# Patient Record
Sex: MALE | Race: WHITE | NOT HISPANIC OR LATINO
[De-identification: names, ages, dates, MRNs, and addresses within clinical notes are randomized per-mention and may not be internally consistent; named-entity substitution may affect disease eponyms.]

---

## 2018-07-31 PROBLEM — Z00.00 ENCOUNTER FOR PREVENTIVE HEALTH EXAMINATION: Status: ACTIVE | Noted: 2018-07-31

## 2018-08-01 ENCOUNTER — APPOINTMENT (OUTPATIENT)
Dept: CT IMAGING | Facility: CLINIC | Age: 73
End: 2018-08-01
Payer: MEDICARE

## 2018-08-01 ENCOUNTER — OUTPATIENT (OUTPATIENT)
Dept: OUTPATIENT SERVICES | Facility: HOSPITAL | Age: 73
LOS: 1 days | End: 2018-08-01
Payer: MEDICARE

## 2018-08-01 DIAGNOSIS — Z00.8 ENCOUNTER FOR OTHER GENERAL EXAMINATION: ICD-10-CM

## 2018-08-01 PROCEDURE — 71260 CT THORAX DX C+: CPT | Mod: 26

## 2018-08-01 PROCEDURE — 74177 CT ABD & PELVIS W/CONTRAST: CPT | Mod: 26

## 2018-08-01 PROCEDURE — 71260 CT THORAX DX C+: CPT

## 2018-08-01 PROCEDURE — 82565 ASSAY OF CREATININE: CPT

## 2018-08-01 PROCEDURE — 74177 CT ABD & PELVIS W/CONTRAST: CPT

## 2018-08-08 ENCOUNTER — FORM ENCOUNTER (OUTPATIENT)
Age: 73
End: 2018-08-08

## 2018-08-09 ENCOUNTER — APPOINTMENT (OUTPATIENT)
Dept: SURGERY | Facility: CLINIC | Age: 73
End: 2018-08-09
Payer: MEDICARE

## 2018-08-09 ENCOUNTER — OUTPATIENT (OUTPATIENT)
Dept: OUTPATIENT SERVICES | Facility: HOSPITAL | Age: 73
LOS: 1 days | End: 2018-08-09
Payer: MEDICARE

## 2018-08-09 ENCOUNTER — APPOINTMENT (OUTPATIENT)
Dept: MRI IMAGING | Facility: IMAGING CENTER | Age: 73
End: 2018-08-09
Payer: MEDICARE

## 2018-08-09 VITALS
RESPIRATION RATE: 15 BRPM | DIASTOLIC BLOOD PRESSURE: 91 MMHG | HEART RATE: 75 BPM | OXYGEN SATURATION: 99 % | TEMPERATURE: 98.2 F | SYSTOLIC BLOOD PRESSURE: 163 MMHG

## 2018-08-09 VITALS — BODY MASS INDEX: 26.32 KG/M2 | HEIGHT: 71 IN | WEIGHT: 188 LBS

## 2018-08-09 DIAGNOSIS — Z87.19 PERSONAL HISTORY OF OTHER DISEASES OF THE DIGESTIVE SYSTEM: ICD-10-CM

## 2018-08-09 DIAGNOSIS — I10 ESSENTIAL (PRIMARY) HYPERTENSION: ICD-10-CM

## 2018-08-09 DIAGNOSIS — Z78.9 OTHER SPECIFIED HEALTH STATUS: ICD-10-CM

## 2018-08-09 DIAGNOSIS — R01.1 CARDIAC MURMUR, UNSPECIFIED: ICD-10-CM

## 2018-08-09 DIAGNOSIS — C20 MALIGNANT NEOPLASM OF RECTUM: ICD-10-CM

## 2018-08-09 PROCEDURE — 99204 OFFICE O/P NEW MOD 45 MIN: CPT

## 2018-08-09 PROCEDURE — 72197 MRI PELVIS W/O & W/DYE: CPT

## 2018-08-09 PROCEDURE — 72197 MRI PELVIS W/O & W/DYE: CPT | Mod: 26

## 2018-08-09 PROCEDURE — 45300 PROCTOSIGMOIDOSCOPY DX: CPT

## 2018-08-09 PROCEDURE — A9585: CPT

## 2018-08-09 PROCEDURE — 82565 ASSAY OF CREATININE: CPT

## 2018-08-15 ENCOUNTER — APPOINTMENT (OUTPATIENT)
Dept: SURGERY | Facility: CLINIC | Age: 73
End: 2018-08-15
Payer: MEDICARE

## 2018-08-15 VITALS
OXYGEN SATURATION: 99 % | RESPIRATION RATE: 16 BRPM | SYSTOLIC BLOOD PRESSURE: 160 MMHG | TEMPERATURE: 98.2 F | DIASTOLIC BLOOD PRESSURE: 75 MMHG | HEART RATE: 82 BPM

## 2018-08-15 PROCEDURE — 45300 PROCTOSIGMOIDOSCOPY DX: CPT

## 2018-08-15 PROCEDURE — 99214 OFFICE O/P EST MOD 30 MIN: CPT | Mod: 25

## 2018-08-17 ENCOUNTER — RESULT REVIEW (OUTPATIENT)
Age: 73
End: 2018-08-17

## 2018-08-29 ENCOUNTER — OUTPATIENT (OUTPATIENT)
Dept: OUTPATIENT SERVICES | Facility: HOSPITAL | Age: 73
LOS: 1 days | End: 2018-08-29
Payer: MEDICARE

## 2018-08-29 ENCOUNTER — APPOINTMENT (OUTPATIENT)
Dept: UROLOGY | Facility: CLINIC | Age: 73
End: 2018-08-29
Payer: MEDICARE

## 2018-08-29 VITALS
SYSTOLIC BLOOD PRESSURE: 165 MMHG | WEIGHT: 186 LBS | HEIGHT: 71 IN | DIASTOLIC BLOOD PRESSURE: 74 MMHG | RESPIRATION RATE: 16 BRPM | HEART RATE: 78 BPM | TEMPERATURE: 98.8 F | BODY MASS INDEX: 26.04 KG/M2

## 2018-08-29 VITALS
SYSTOLIC BLOOD PRESSURE: 146 MMHG | HEART RATE: 75 BPM | TEMPERATURE: 99 F | RESPIRATION RATE: 16 BRPM | OXYGEN SATURATION: 100 % | WEIGHT: 189.6 LBS | HEIGHT: 71 IN | DIASTOLIC BLOOD PRESSURE: 82 MMHG

## 2018-08-29 DIAGNOSIS — I10 ESSENTIAL (PRIMARY) HYPERTENSION: ICD-10-CM

## 2018-08-29 DIAGNOSIS — Z87.19 PERSONAL HISTORY OF OTHER DISEASES OF THE DIGESTIVE SYSTEM: Chronic | ICD-10-CM

## 2018-08-29 DIAGNOSIS — C18.9 MALIGNANT NEOPLASM OF COLON, UNSPECIFIED: ICD-10-CM

## 2018-08-29 DIAGNOSIS — S86.019A STRAIN OF UNSPECIFIED ACHILLES TENDON, INITIAL ENCOUNTER: Chronic | ICD-10-CM

## 2018-08-29 DIAGNOSIS — C20 MALIGNANT NEOPLASM OF RECTUM: ICD-10-CM

## 2018-08-29 DIAGNOSIS — Z01.818 ENCOUNTER FOR OTHER PREPROCEDURAL EXAMINATION: ICD-10-CM

## 2018-08-29 LAB
ANION GAP SERPL CALC-SCNC: 16 MMOL/L — SIGNIFICANT CHANGE UP (ref 5–17)
BLD GP AB SCN SERPL QL: NEGATIVE — SIGNIFICANT CHANGE UP
BUN SERPL-MCNC: 16 MG/DL — SIGNIFICANT CHANGE UP (ref 7–23)
CALCIUM SERPL-MCNC: 9.1 MG/DL — SIGNIFICANT CHANGE UP (ref 8.4–10.5)
CHLORIDE SERPL-SCNC: 100 MMOL/L — SIGNIFICANT CHANGE UP (ref 96–108)
CO2 SERPL-SCNC: 20 MMOL/L — LOW (ref 22–31)
CREAT SERPL-MCNC: 0.95 MG/DL — SIGNIFICANT CHANGE UP (ref 0.5–1.3)
GLUCOSE SERPL-MCNC: 133 MG/DL — HIGH (ref 70–99)
HBA1C BLD-MCNC: 5.8 % — HIGH (ref 4–5.6)
HCT VFR BLD CALC: 41.5 % — SIGNIFICANT CHANGE UP (ref 39–50)
HGB BLD-MCNC: 13.7 G/DL — SIGNIFICANT CHANGE UP (ref 13–17)
MCHC RBC-ENTMCNC: 27 PG — SIGNIFICANT CHANGE UP (ref 27–34)
MCHC RBC-ENTMCNC: 33 GM/DL — SIGNIFICANT CHANGE UP (ref 32–36)
MCV RBC AUTO: 81.9 FL — SIGNIFICANT CHANGE UP (ref 80–100)
PLATELET # BLD AUTO: 235 K/UL — SIGNIFICANT CHANGE UP (ref 150–400)
POTASSIUM SERPL-MCNC: 3.9 MMOL/L — SIGNIFICANT CHANGE UP (ref 3.5–5.3)
POTASSIUM SERPL-SCNC: 3.9 MMOL/L — SIGNIFICANT CHANGE UP (ref 3.5–5.3)
RBC # BLD: 5.07 M/UL — SIGNIFICANT CHANGE UP (ref 4.2–5.8)
RBC # FLD: 13.2 % — SIGNIFICANT CHANGE UP (ref 10.3–14.5)
RH IG SCN BLD-IMP: POSITIVE — SIGNIFICANT CHANGE UP
SODIUM SERPL-SCNC: 136 MMOL/L — SIGNIFICANT CHANGE UP (ref 135–145)
WBC # BLD: 6.96 K/UL — SIGNIFICANT CHANGE UP (ref 3.8–10.5)
WBC # FLD AUTO: 6.96 K/UL — SIGNIFICANT CHANGE UP (ref 3.8–10.5)

## 2018-08-29 PROCEDURE — 99204 OFFICE O/P NEW MOD 45 MIN: CPT

## 2018-08-29 PROCEDURE — 86900 BLOOD TYPING SEROLOGIC ABO: CPT

## 2018-08-29 PROCEDURE — 86850 RBC ANTIBODY SCREEN: CPT

## 2018-08-29 PROCEDURE — 86901 BLOOD TYPING SEROLOGIC RH(D): CPT

## 2018-08-29 PROCEDURE — 83036 HEMOGLOBIN GLYCOSYLATED A1C: CPT

## 2018-08-29 PROCEDURE — G0463: CPT

## 2018-08-29 PROCEDURE — 87086 URINE CULTURE/COLONY COUNT: CPT

## 2018-08-29 PROCEDURE — 80048 BASIC METABOLIC PNL TOTAL CA: CPT

## 2018-08-29 PROCEDURE — 85027 COMPLETE CBC AUTOMATED: CPT

## 2018-08-29 NOTE — H&P PST ADULT - PSH
Achilles tendon rupture  11/2/17, left  > repaired 11/2/17 in [facility]  H/O appendicitis  age 26, open surgery

## 2018-08-29 NOTE — H&P PST ADULT - HISTORY OF PRESENT ILLNESS
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

## 2018-08-30 LAB
CULTURE RESULTS: NO GROWTH — SIGNIFICANT CHANGE UP
SPECIMEN SOURCE: SIGNIFICANT CHANGE UP

## 2018-09-03 ENCOUNTER — TRANSCRIPTION ENCOUNTER (OUTPATIENT)
Age: 73
End: 2018-09-03

## 2018-09-04 ENCOUNTER — INPATIENT (INPATIENT)
Facility: HOSPITAL | Age: 73
LOS: 2 days | Discharge: HOME | DRG: 330 | End: 2018-09-07
Payer: MEDICARE

## 2018-09-04 ENCOUNTER — APPOINTMENT (OUTPATIENT)
Dept: SURGERY | Facility: HOSPITAL | Age: 73
End: 2018-09-04
Payer: MEDICARE

## 2018-09-04 ENCOUNTER — RESULT REVIEW (OUTPATIENT)
Age: 73
End: 2018-09-04

## 2018-09-04 VITALS
WEIGHT: 189.6 LBS | OXYGEN SATURATION: 99 % | HEIGHT: 71 IN | SYSTOLIC BLOOD PRESSURE: 156 MMHG | DIASTOLIC BLOOD PRESSURE: 80 MMHG | TEMPERATURE: 98 F | HEART RATE: 86 BPM | RESPIRATION RATE: 16 BRPM

## 2018-09-04 DIAGNOSIS — S86.019A STRAIN OF UNSPECIFIED ACHILLES TENDON, INITIAL ENCOUNTER: Chronic | ICD-10-CM

## 2018-09-04 DIAGNOSIS — Z87.19 PERSONAL HISTORY OF OTHER DISEASES OF THE DIGESTIVE SYSTEM: Chronic | ICD-10-CM

## 2018-09-04 DIAGNOSIS — Z85.048 PERSONAL HISTORY OF OTHER MALIGNANT NEOPLASM OF RECTUM, RECTOSIGMOID JUNCTION, AND ANUS: ICD-10-CM

## 2018-09-04 DIAGNOSIS — C20 MALIGNANT NEOPLASM OF RECTUM: ICD-10-CM

## 2018-09-04 LAB — GLUCOSE BLDC GLUCOMTR-MCNC: 116 MG/DL — HIGH (ref 70–99)

## 2018-09-04 PROCEDURE — 88309 TISSUE EXAM BY PATHOLOGIST: CPT | Mod: 26

## 2018-09-04 PROCEDURE — 88342 IMHCHEM/IMCYTCHM 1ST ANTB: CPT | Mod: 26

## 2018-09-04 PROCEDURE — 44139 MOBILIZATION OF COLON: CPT

## 2018-09-04 PROCEDURE — 88341 IMHCHEM/IMCYTCHM EA ADD ANTB: CPT | Mod: 26

## 2018-09-04 PROCEDURE — 49587: CPT | Mod: 59

## 2018-09-04 PROCEDURE — 52005 CYSTO W/URTRL CATHJ: CPT

## 2018-09-04 PROCEDURE — 44145 PARTIAL REMOVAL OF COLON: CPT

## 2018-09-04 PROCEDURE — 88313 SPECIAL STAINS GROUP 2: CPT | Mod: 26

## 2018-09-04 PROCEDURE — 88304 TISSUE EXAM BY PATHOLOGIST: CPT | Mod: 26

## 2018-09-04 RX ORDER — HEPARIN SODIUM 5000 [USP'U]/ML
5000 INJECTION INTRAVENOUS; SUBCUTANEOUS ONCE
Qty: 0 | Refills: 0 | Status: COMPLETED | OUTPATIENT
Start: 2018-09-04 | End: 2018-09-04

## 2018-09-04 RX ORDER — MORPHINE SULFATE 50 MG/1
0.2 CAPSULE, EXTENDED RELEASE ORAL ONCE
Qty: 0 | Refills: 0 | Status: DISCONTINUED | OUTPATIENT
Start: 2018-09-04 | End: 2018-09-05

## 2018-09-04 RX ORDER — AMLODIPINE BESYLATE AND OLMESARTRAN MEDOXOMIL 10; 40 MG/1; MG/1
1 TABLET, FILM COATED ORAL
Qty: 0 | Refills: 0 | COMMUNITY

## 2018-09-04 RX ORDER — ONDANSETRON 8 MG/1
4 TABLET, FILM COATED ORAL EVERY 6 HOURS
Qty: 0 | Refills: 0 | Status: DISCONTINUED | OUTPATIENT
Start: 2018-09-04 | End: 2018-09-05

## 2018-09-04 RX ORDER — ENOXAPARIN SODIUM 100 MG/ML
40 INJECTION SUBCUTANEOUS DAILY
Qty: 0 | Refills: 0 | Status: DISCONTINUED | OUTPATIENT
Start: 2018-09-04 | End: 2018-09-07

## 2018-09-04 RX ORDER — ACETAMINOPHEN 500 MG
1000 TABLET ORAL ONCE
Qty: 0 | Refills: 0 | Status: COMPLETED | OUTPATIENT
Start: 2018-09-05 | End: 2018-09-05

## 2018-09-04 RX ORDER — SODIUM CHLORIDE 9 MG/ML
1000 INJECTION, SOLUTION INTRAVENOUS
Qty: 0 | Refills: 0 | Status: DISCONTINUED | OUTPATIENT
Start: 2018-09-04 | End: 2018-09-05

## 2018-09-04 RX ORDER — GABAPENTIN 400 MG/1
600 CAPSULE ORAL ONCE
Qty: 0 | Refills: 0 | Status: COMPLETED | OUTPATIENT
Start: 2018-09-04 | End: 2018-09-04

## 2018-09-04 RX ORDER — LIDOCAINE HCL 20 MG/ML
0.2 VIAL (ML) INJECTION ONCE
Qty: 0 | Refills: 0 | Status: DISCONTINUED | OUTPATIENT
Start: 2018-09-04 | End: 2018-09-04

## 2018-09-04 RX ORDER — CHOLECALCIFEROL (VITAMIN D3) 125 MCG
1 CAPSULE ORAL
Qty: 0 | Refills: 0 | COMMUNITY

## 2018-09-04 RX ORDER — KETOROLAC TROMETHAMINE 30 MG/ML
15 SYRINGE (ML) INJECTION EVERY 6 HOURS
Qty: 0 | Refills: 0 | Status: DISCONTINUED | OUTPATIENT
Start: 2018-09-04 | End: 2018-09-05

## 2018-09-04 RX ORDER — ASCORBIC ACID 60 MG
1 TABLET,CHEWABLE ORAL
Qty: 0 | Refills: 0 | COMMUNITY

## 2018-09-04 RX ORDER — CELECOXIB 200 MG/1
400 CAPSULE ORAL ONCE
Qty: 0 | Refills: 0 | Status: COMPLETED | OUTPATIENT
Start: 2018-09-04 | End: 2018-09-04

## 2018-09-04 RX ORDER — OXYCODONE HYDROCHLORIDE 5 MG/1
5 TABLET ORAL EVERY 4 HOURS
Qty: 0 | Refills: 0 | Status: DISCONTINUED | OUTPATIENT
Start: 2018-09-04 | End: 2018-09-07

## 2018-09-04 RX ORDER — SODIUM CHLORIDE 9 MG/ML
3 INJECTION INTRAMUSCULAR; INTRAVENOUS; SUBCUTANEOUS EVERY 8 HOURS
Qty: 0 | Refills: 0 | Status: DISCONTINUED | OUTPATIENT
Start: 2018-09-04 | End: 2018-09-04

## 2018-09-04 RX ORDER — NALOXONE HYDROCHLORIDE 4 MG/.1ML
0.1 SPRAY NASAL
Qty: 0 | Refills: 0 | Status: DISCONTINUED | OUTPATIENT
Start: 2018-09-04 | End: 2018-09-05

## 2018-09-04 RX ORDER — ACETAMINOPHEN 500 MG
1000 TABLET ORAL ONCE
Qty: 0 | Refills: 0 | Status: COMPLETED | OUTPATIENT
Start: 2018-09-04 | End: 2018-09-04

## 2018-09-04 RX ORDER — CEFOTETAN DISODIUM 1 G
2 VIAL (EA) INJECTION ONCE
Qty: 0 | Refills: 0 | Status: DISCONTINUED | OUTPATIENT
Start: 2018-09-04 | End: 2018-09-04

## 2018-09-04 RX ORDER — OXYCODONE HYDROCHLORIDE 5 MG/1
10 TABLET ORAL EVERY 4 HOURS
Qty: 0 | Refills: 0 | Status: DISCONTINUED | OUTPATIENT
Start: 2018-09-04 | End: 2018-09-07

## 2018-09-04 RX ORDER — ONDANSETRON 8 MG/1
4 TABLET, FILM COATED ORAL ONCE
Qty: 0 | Refills: 0 | Status: DISCONTINUED | OUTPATIENT
Start: 2018-09-04 | End: 2018-09-04

## 2018-09-04 RX ADMIN — Medication 1000 MILLIGRAM(S): at 22:38

## 2018-09-04 RX ADMIN — SODIUM CHLORIDE 3 MILLILITER(S): 9 INJECTION INTRAMUSCULAR; INTRAVENOUS; SUBCUTANEOUS at 07:25

## 2018-09-04 RX ADMIN — Medication 400 MILLIGRAM(S): at 22:02

## 2018-09-04 RX ADMIN — GABAPENTIN 600 MILLIGRAM(S): 400 CAPSULE ORAL at 07:22

## 2018-09-04 RX ADMIN — CELECOXIB 400 MILLIGRAM(S): 200 CAPSULE ORAL at 07:25

## 2018-09-04 RX ADMIN — SODIUM CHLORIDE 40 MILLILITER(S): 9 INJECTION, SOLUTION INTRAVENOUS at 17:00

## 2018-09-04 RX ADMIN — Medication 15 MILLIGRAM(S): at 18:35

## 2018-09-04 RX ADMIN — HEPARIN SODIUM 5000 UNIT(S): 5000 INJECTION INTRAVENOUS; SUBCUTANEOUS at 07:22

## 2018-09-04 RX ADMIN — CELECOXIB 400 MILLIGRAM(S): 200 CAPSULE ORAL at 07:22

## 2018-09-04 RX ADMIN — Medication 15 MILLIGRAM(S): at 18:05

## 2018-09-04 NOTE — CHART NOTE - NSCHARTNOTEFT_GEN_A_CORE
Post-Operative Note      Procedure: Laparoscopic low anterior resection    Surgeon: Dr. Singh    Subjective: Patient examined at bedside in the PACU. Reports pain is well controlled. Denies nausea/vomiting, tolerating clear liquid diet. Denies flatus or BM yet, patient has not ambulated yet and has yu catheter in place.    Vital Signs Last 24 Hrs  T(C): 36.5 (04 Sep 2018 16:00), Max: 36.7 (04 Sep 2018 07:25)  T(F): 97.7 (04 Sep 2018 16:00), Max: 98.1 (04 Sep 2018 07:25)  HR: 65 (04 Sep 2018 19:00) (63 - 86)  BP: 117/57 (04 Sep 2018 19:00) (99/51 - 156/80)  BP(mean): 81 (04 Sep 2018 19:00) (72 - 85)  RR: 16 (04 Sep 2018 19:00) (12 - 20)  SpO2: 97% (04 Sep 2018 19:00) (94% - 99%)    Physical Exam:  General: NAD, resting comfortably in bed  Pulmonary: Nonlabored breathing, no respiratory distress  Cardiovascular: NSR  Abdominal: soft, mild tenderness to palpation associated with incision sites, dressings C/D/I            CAPILLARY BLOOD GLUCOSE      POCT Blood Glucose.: 116 mg/dL (04 Sep 2018 07:16)              Radiology and Additional Studies:    Assessment:73y Male s/p ERP Laparoscopic low anterior resection    Plan:  -  Pain control  -  Nausea control  -  c/w CLD  -  IVF@40   -  ERP protocol  -  f/u AM labs  -  Monitor vital signs  -  Ambulate as tolerated

## 2018-09-04 NOTE — PATIENT PROFILE ADULT. - PSH
Achilles tendon rupture  11/2/17, left  > repaired 11/2/17 in [facility]  H/O appendicitis  age 26, open surgery

## 2018-09-04 NOTE — BRIEF OPERATIVE NOTE - PROCEDURE
<<-----Click on this checkbox to enter Procedure Laparoscopic low anterior resection  09/04/2018  With hand-assist  Active  MARVAO

## 2018-09-05 ENCOUNTER — TRANSCRIPTION ENCOUNTER (OUTPATIENT)
Age: 73
End: 2018-09-05

## 2018-09-05 LAB
ANION GAP SERPL CALC-SCNC: 12 MMOL/L — SIGNIFICANT CHANGE UP (ref 5–17)
APPEARANCE: CLEAR
BACTERIA UR CULT: NORMAL
BACTERIA: NEGATIVE
BILIRUBIN URINE: NEGATIVE
BLOOD URINE: NEGATIVE
BUN SERPL-MCNC: 14 MG/DL — SIGNIFICANT CHANGE UP (ref 7–23)
CALCIUM SERPL-MCNC: 8.1 MG/DL — LOW (ref 8.4–10.5)
CHLORIDE SERPL-SCNC: 104 MMOL/L — SIGNIFICANT CHANGE UP (ref 96–108)
CO2 SERPL-SCNC: 23 MMOL/L — SIGNIFICANT CHANGE UP (ref 22–31)
COLOR: YELLOW
CORE LAB FLUID CYTOLOGY: NORMAL
CREAT SERPL-MCNC: 1.17 MG/DL — SIGNIFICANT CHANGE UP (ref 0.5–1.3)
GLUCOSE QUALITATIVE U: NEGATIVE MG/DL
GLUCOSE SERPL-MCNC: 142 MG/DL — HIGH (ref 70–99)
HCT VFR BLD CALC: 32.6 % — LOW (ref 39–50)
HCT VFR BLD CALC: 34.8 % — LOW (ref 39–50)
HGB BLD-MCNC: 11.1 G/DL — LOW (ref 13–17)
HGB BLD-MCNC: 11.6 G/DL — LOW (ref 13–17)
HYALINE CASTS: 1 /LPF
KETONES URINE: NEGATIVE
LEUKOCYTE ESTERASE URINE: NEGATIVE
MAGNESIUM SERPL-MCNC: 2.2 MG/DL — SIGNIFICANT CHANGE UP (ref 1.6–2.6)
MCHC RBC-ENTMCNC: 27.6 PG — SIGNIFICANT CHANGE UP (ref 27–34)
MCHC RBC-ENTMCNC: 28.4 PG — SIGNIFICANT CHANGE UP (ref 27–34)
MCHC RBC-ENTMCNC: 33.2 GM/DL — SIGNIFICANT CHANGE UP (ref 32–36)
MCHC RBC-ENTMCNC: 33.9 GM/DL — SIGNIFICANT CHANGE UP (ref 32–36)
MCV RBC AUTO: 83.1 FL — SIGNIFICANT CHANGE UP (ref 80–100)
MCV RBC AUTO: 83.7 FL — SIGNIFICANT CHANGE UP (ref 80–100)
MICROSCOPIC-UA: NORMAL
NITRITE URINE: NEGATIVE
PH URINE: 6
PHOSPHATE SERPL-MCNC: 2.8 MG/DL — SIGNIFICANT CHANGE UP (ref 2.5–4.5)
PLATELET # BLD AUTO: 202 K/UL — SIGNIFICANT CHANGE UP (ref 150–400)
PLATELET # BLD AUTO: 219 K/UL — SIGNIFICANT CHANGE UP (ref 150–400)
POTASSIUM SERPL-MCNC: 3.9 MMOL/L — SIGNIFICANT CHANGE UP (ref 3.5–5.3)
POTASSIUM SERPL-SCNC: 3.9 MMOL/L — SIGNIFICANT CHANGE UP (ref 3.5–5.3)
PROTEIN URINE: ABNORMAL MG/DL
RBC # BLD: 3.9 M/UL — LOW (ref 4.2–5.8)
RBC # BLD: 4.19 M/UL — LOW (ref 4.2–5.8)
RBC # FLD: 12 % — SIGNIFICANT CHANGE UP (ref 10.3–14.5)
RBC # FLD: 12.3 % — SIGNIFICANT CHANGE UP (ref 10.3–14.5)
RED BLOOD CELLS URINE: 2 /HPF
SODIUM SERPL-SCNC: 139 MMOL/L — SIGNIFICANT CHANGE UP (ref 135–145)
SPECIFIC GRAVITY URINE: 1.02
SQUAMOUS EPITHELIAL CELLS: 0 /HPF
UROBILINOGEN URINE: NEGATIVE MG/DL
WBC # BLD: 8.6 K/UL — SIGNIFICANT CHANGE UP (ref 3.8–10.5)
WBC # BLD: 9.9 K/UL — SIGNIFICANT CHANGE UP (ref 3.8–10.5)
WBC # FLD AUTO: 8.6 K/UL — SIGNIFICANT CHANGE UP (ref 3.8–10.5)
WBC # FLD AUTO: 9.9 K/UL — SIGNIFICANT CHANGE UP (ref 3.8–10.5)
WHITE BLOOD CELLS URINE: 0 /HPF

## 2018-09-05 RX ORDER — INFLUENZA VIRUS VACCINE 15; 15; 15; 15 UG/.5ML; UG/.5ML; UG/.5ML; UG/.5ML
0.5 SUSPENSION INTRAMUSCULAR ONCE
Qty: 0 | Refills: 0 | Status: DISCONTINUED | OUTPATIENT
Start: 2018-09-05 | End: 2018-09-07

## 2018-09-05 RX ADMIN — Medication 15 MILLIGRAM(S): at 13:45

## 2018-09-05 RX ADMIN — Medication 400 MILLIGRAM(S): at 04:42

## 2018-09-05 RX ADMIN — Medication 400 MILLIGRAM(S): at 08:50

## 2018-09-05 RX ADMIN — Medication 1000 MILLIGRAM(S): at 08:51

## 2018-09-05 RX ADMIN — Medication 15 MILLIGRAM(S): at 08:42

## 2018-09-05 RX ADMIN — Medication 400 MILLIGRAM(S): at 14:47

## 2018-09-05 RX ADMIN — Medication 1000 MILLIGRAM(S): at 05:15

## 2018-09-05 RX ADMIN — OXYCODONE HYDROCHLORIDE 5 MILLIGRAM(S): 5 TABLET ORAL at 22:57

## 2018-09-05 RX ADMIN — Medication 1000 MILLIGRAM(S): at 14:48

## 2018-09-05 RX ADMIN — Medication 15 MILLIGRAM(S): at 02:36

## 2018-09-05 RX ADMIN — Medication 15 MILLIGRAM(S): at 13:47

## 2018-09-05 RX ADMIN — Medication 15 MILLIGRAM(S): at 02:03

## 2018-09-05 RX ADMIN — OXYCODONE HYDROCHLORIDE 5 MILLIGRAM(S): 5 TABLET ORAL at 23:07

## 2018-09-05 RX ADMIN — ENOXAPARIN SODIUM 40 MILLIGRAM(S): 100 INJECTION SUBCUTANEOUS at 11:46

## 2018-09-05 RX ADMIN — Medication 15 MILLIGRAM(S): at 08:51

## 2018-09-05 NOTE — DISCHARGE NOTE ADULT - NS AS ACTIVITY OBS
Not while taking pain medication/No Heavy lifting/straining/Do not make important decisions/Do not drive or operate machinery/Showering allowed

## 2018-09-05 NOTE — PROGRESS NOTE ADULT - ATTENDING COMMENTS
I saw and evaluated patient at 7:10 this am and discussed with team.  Patient was doing well and plan was to continue ERP.

## 2018-09-05 NOTE — DISCHARGE NOTE ADULT - HOSPITAL COURSE
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 This is a 73 year old retired  male with h/o blood stained stool for a year & colonoscopy, MRI, and rigid sigmoidoscopy revealed high rectal cancer T3N0 above peritoneal reflection at 10-11 cm, scheduled for surgery. Patient underwent a LAR on 9/4/18. The patient tolerated the procedure well. There were no complications. The patient was extubated in the OR.  The patient was transferred to the PACU in stable condition.     On the floor pain was controlled. Patient kept ERP. Diet advanced as tolerated. POD 2 he developed some nausea. It resolved with medication. H/H stable/ WBC normal. Patient ambulating well. DVT ppx prescribed for home (Eliquis 2.5 mg BID) for 30 days. CrCl 85 and Caprini score 7. Preventative ASA (home med) held while on eliquis.     At the time of discharge, the patient was hemodynamically stable, was tolerating PO diet, was voiding urine and passing stool, was ambulating, and was comfortable with adequate pain control. The patient was instructed to follow up with Dr. Singh within 1-2 weeks after discharge from the hospital. The patient felt comfortable with discharge. The patient was discharged to home. The patient had no other issues. This is a 73 year old retired  male with h/o blood stained stool for a year & colonoscopy, MRI, and rigid sigmoidoscopy revealed high rectal cancer T3N0 above peritoneal reflection at 10-11 cm, scheduled for surgery. Patient underwent a LAR on 9/4/18. The patient tolerated the procedure well. There were no complications. The patient was extubated in the OR.  The patient was transferred to the PACU in stable condition.     On the floor pain was controlled. Patient kept ERP. Diet advanced as tolerated. POD 2 he developed some nausea. It resolved with medication. H/H stable/ WBC normal. Patient ambulating well. DVT ppx prescribed for home (Eliquis 2.5 mg BID) for 30 days. CrCl 85 and Caprini score 7. Preventative ASA (home med) held while on eliquis. Penrose removed POD 3.     At the time of discharge, the patient was hemodynamically stable, was tolerating PO diet, was voiding urine and passing stool, was ambulating, and was comfortable with adequate pain control. The patient was instructed to follow up with Dr. Singh within 1-2 weeks after discharge from the hospital. The patient felt comfortable with discharge. The patient was discharged to home. The patient had no other issues.

## 2018-09-05 NOTE — DISCHARGE NOTE ADULT - CONDITION (STATED IN TERMS THAT PERMIT A SPECIFIC MEASURABLE COMPARISON WITH CONDITION ON ADMISSION):
Seen and examined by Attending Physician who deemed, and cleared patient stable for discharge. Afebrile. Vital Signs Stable.

## 2018-09-05 NOTE — DISCHARGE NOTE ADULT - CARE PROVIDER_API CALL
Tai Singh), ColonRectal Surgery; Surgery  [address] [facility]  [address] [place] [id number]  Phone: [phone]  Fax: [phone]

## 2018-09-05 NOTE — PROGRESS NOTE ADULT - SUBJECTIVE AND OBJECTIVE BOX
SURGERY DAILY PROGRESS NOTE:       SUBJECTIVE/ROS: Patient examined at bedside. No acute events overnight. Pain well controlled. Denies N/V.          MEDICATIONS  (STANDING):  acetaminophen  IVPB .. 1000 milliGRAM(s) IV Intermittent once  acetaminophen  IVPB .. 1000 milliGRAM(s) IV Intermittent once  enoxaparin Injectable 40 milliGRAM(s) SubCutaneous daily  influenza   Vaccine 0.5 milliLiter(s) IntraMuscular once  ketorolac   Injectable 15 milliGRAM(s) IV Push every 6 hours  lactated ringers. 1000 milliLiter(s) (40 mL/Hr) IV Continuous <Continuous>  morphine PF Spinal 0.2 milliGRAM(s) IntraThecal. once    MEDICATIONS  (PRN):  naloxone Injectable 0.1 milliGRAM(s) IV Push every 3 minutes PRN For ANY of the following changes in patient status:  A. RR LESS THAN 10 breaths per minute, B. Oxygen saturation LESS THAN 90%, C. Sedation score of 6  ondansetron Injectable 4 milliGRAM(s) IV Push every 6 hours PRN Nausea  oxyCODONE    IR 5 milliGRAM(s) Oral every 4 hours PRN Moderate Pain (4 - 6)  oxyCODONE    IR 10 milliGRAM(s) Oral every 4 hours PRN Severe Pain (7 - 10)      OBJECTIVE:    Vital Signs Last 24 Hrs  T(C): 36.6 (05 Sep 2018 05:10), Max: 36.7 (04 Sep 2018 07:25)  T(F): 97.8 (05 Sep 2018 05:10), Max: 98.1 (04 Sep 2018 07:25)  HR: 55 (05 Sep 2018 05:10) (55 - 86)  BP: 110/65 (05 Sep 2018 05:10) (99/51 - 156/80)  BP(mean): 86 (04 Sep 2018 21:00) (72 - 86)  RR: 18 (05 Sep 2018 05:10) (12 - 20)  SpO2: 99% (05 Sep 2018 05:10) (94% - 99%)        I&O's Detail    04 Sep 2018 07:01  -  05 Sep 2018 05:34  --------------------------------------------------------  IN:    lactated ringers.: 260 mL    Oral Fluid: 200 mL  Total IN: 460 mL    OUT:    Indwelling Catheter - Urethral: 620 mL  Total OUT: 620 mL    Total NET: -160 mL          Daily Height in cm: 180.34 (04 Sep 2018 07:25)    Daily     LABS:                Physical Exam:  General: NAD, resting comfortably in bed  Pulmonary: Nonlabored breathing, no respiratory distress  Cardiovascular: NSR  Abdominal: soft, mild tenderness to palpation associated with incision sites, dressings C/D/I

## 2018-09-05 NOTE — DISCHARGE NOTE ADULT - CARE PLAN
Principal Discharge DX:	Malignant neoplasm of colon, unspecified part of colon Principal Discharge DX:	Malignant neoplasm of colon, unspecified part of colon  Goal:	s/p LAR. Pain control and outpatient follow up.  Assessment and plan of treatment:	Hold your aspirin while taking Eliquis.   Eliquis (2.5 mg) twice daily for 30 days to prevent blood clots.   Please follow up with Dr. Singh in 1-2 weeks. Contact info below.

## 2018-09-05 NOTE — DISCHARGE NOTE ADULT - PATIENT PORTAL LINK FT
You can access the [facility] Patient Portal, offered by [facility], by registering with the following website: [email or web address]

## 2018-09-05 NOTE — DISCHARGE NOTE ADULT - MEDICATION SUMMARY - MEDICATIONS TO STOP TAKING
I will STOP taking the medications listed below when I get home from the hospital:    Yane Aspirin 325 mg oral tablet  -- 1 tab(s) by mouth once a day "taking by self for total prevention from stroke  & heart attack"

## 2018-09-05 NOTE — PROGRESS NOTE ADULT - ASSESSMENT
Assessment:73y Male s/p ERP Laparoscopic low anterior resection    Plan:  -  Pain control  -  Nausea control  -  c/w CLD  -  IVF@40   -  ERP protocol  -  f/u AM labs  -  Monitor vital signs  -  Ambulate as tolerated      Green [id number]

## 2018-09-05 NOTE — DISCHARGE NOTE ADULT - MEDICATION SUMMARY - MEDICATIONS TO TAKE
I will START or STAY ON the medications listed below when I get home from the hospital:    oxyCODONE-acetaminophen 5 mg-325 mg oral tablet  -- 1 tab(s) by mouth every 4 hours, As Needed -for moderate pain MDD:6 tabs   -- Caution federal law prohibits the transfer of this drug to any person other  than the person for whom it was prescribed.  May cause drowsiness.  Alcohol may intensify this effect.  Use care when operating dangerous machinery.  This prescription cannot be refilled.  This product contains acetaminophen.  Do not use  with any other product containing acetaminophen to prevent possible liver damage.  Using more of this medication than prescribed may cause serious breathing problems.    -- Indication: For Pain    apixaban 2.5 mg oral tablet  -- 1 tab(s) by mouth 2 times a day MDD:2 tabs  -- Check with your doctor before becoming pregnant.  It is very important that you take or use this exactly as directed.  Do not skip doses or discontinue unless directed by your doctor.  Obtain medical advice before taking any non-prescription drugs as some may affect the action of this medication.    -- Indication: For DVT prophylaxis    Jamie 5 mg-20 mg oral tablet  -- 1 tab(s) by mouth once a day  -- Indication: For Hypertension    hydroCHLOROthiazide 25 mg oral tablet  -- 1 tab(s) by mouth every other day  -- Indication: For Hypertension    Vitamin D3 2000 intl units oral tablet  -- 1 tab(s) by mouth once a day  -- Indication: For Nutrition    Vitamin C 500 mg oral tablet  -- 1 tab(s) by mouth once a day  -- Indication: For Nutrition

## 2018-09-05 NOTE — PROGRESS NOTE ADULT - SUBJECTIVE AND OBJECTIVE BOX
Day 1 of Anesthesia Pain Management Service    SUBJECTIVE:  Pain Scale Score:          [X] Refer to charted pain scores    THERAPY:    s/p ___mg PF morphine on ___      MEDICATIONS  (STANDING):  acetaminophen  IVPB .. 1000 milliGRAM(s) IV Intermittent once  enoxaparin Injectable 40 milliGRAM(s) SubCutaneous daily  influenza   Vaccine 0.5 milliLiter(s) IntraMuscular once  ketorolac   Injectable 15 milliGRAM(s) IV Push every 6 hours  lactated ringers. 1000 milliLiter(s) (40 mL/Hr) IV Continuous <Continuous>  morphine PF Spinal 0.2 milliGRAM(s) IntraThecal. once    MEDICATIONS  (PRN):  naloxone Injectable 0.1 milliGRAM(s) IV Push every 3 minutes PRN For ANY of the following changes in patient status:  A. RR LESS THAN 10 breaths per minute, B. Oxygen saturation LESS THAN 90%, C. Sedation score of 6  ondansetron Injectable 4 milliGRAM(s) IV Push every 6 hours PRN Nausea  oxyCODONE    IR 5 milliGRAM(s) Oral every 4 hours PRN Moderate Pain (4 - 6)  oxyCODONE    IR 10 milliGRAM(s) Oral every 4 hours PRN Severe Pain (7 - 10)      OBJECTIVE:    Sedation:        	[X] Alert	[ ] Drowsy	[ ] Arousable      [ ] Asleep       [ ] Unresponsive    Side Effects:	[X] None	[ ] Nausea	[ ] Vomiting         [ ] Pruritus  		[ ] Weakness            [ ] Numbness	          [ ] Other:    Vital Signs Last 24 Hrs  T(C): 36.6 (05 Sep 2018 05:10), Max: 36.7 (04 Sep 2018 21:41)  T(F): 97.8 (05 Sep 2018 05:10), Max: 98.1 (04 Sep 2018 21:41)  HR: 55 (05 Sep 2018 05:10) (55 - 82)  BP: 110/65 (05 Sep 2018 05:10) (99/51 - 136/76)  BP(mean): 86 (04 Sep 2018 21:00) (72 - 86)  RR: 18 (05 Sep 2018 05:10) (12 - 20)  SpO2: 99% (05 Sep 2018 05:10) (94% - 99%)    ASSESSMENT/ PLAN  [X] Patient transitioned to prn analgesics  [X] Pain management per primary service, pain service to sign off   [X]Documentation and Verification of current medications

## 2018-09-05 NOTE — DISCHARGE NOTE ADULT - PLAN OF CARE
s/p LAR. Pain control and outpatient follow up. Hold your aspirin while taking Eliquis.   Eliquis (2.5 mg) twice daily for 30 days to prevent blood clots.   Please follow up with Dr. Singh in 1-2 weeks. Contact info below.

## 2018-09-05 NOTE — DISCHARGE NOTE ADULT - ADDITIONAL INSTRUCTIONS
WOUND CARE: Steri-strips have been applied to your incisions.  Do not remove these.  They will fall off as they get wet; in approximately 7-10 days.  BATHING: Please do not submerge wound underwater. You may shower and/or sponge bathe.  ACTIVITY: No heavy lifting or straining. Otherwise, you may return to your usual level of physical activity. If you are taking narcotic pain medication (such as Percocet), do NOT drive a car, operate machinery or make important decisions.  DIET: Return to your usual diet.  NOTIFY YOUR SURGEON IF: You have any bleeding that does not stop, any pus draining from your wound, any fever (over 100.4 F) or chills, persistent nausea/vomiting, persistent diarrhea, or if your pain is not controlled on your discharge pain medications.  FOLLOW-UP:  1. Please call to make a follow-up appointment within one week of discharge   2. Please follow up with your primary care physician in one week regarding your hospitalization.    Please notify your Attending Physician if after discharge you have fever, chills, abdominal pain, purulent drainage from your incision, in ability to pass flatus, not tolerating PO diet, abdominal bloating/distention, [place] and or vomiting. Additionally any acute changes as well. Please report back to the Emergency Department if unable to reach Physician. Please call for a follow up appointment with your primary care medical doctor upon discharge regarding your recent surgery and hospitalization.

## 2018-09-06 LAB
ANION GAP SERPL CALC-SCNC: 12 MMOL/L — SIGNIFICANT CHANGE UP (ref 5–17)
BUN SERPL-MCNC: 12 MG/DL — SIGNIFICANT CHANGE UP (ref 7–23)
CALCIUM SERPL-MCNC: 8.3 MG/DL — LOW (ref 8.4–10.5)
CHLORIDE SERPL-SCNC: 104 MMOL/L — SIGNIFICANT CHANGE UP (ref 96–108)
CO2 SERPL-SCNC: 21 MMOL/L — LOW (ref 22–31)
CREAT SERPL-MCNC: 0.91 MG/DL — SIGNIFICANT CHANGE UP (ref 0.5–1.3)
GLUCOSE SERPL-MCNC: 135 MG/DL — HIGH (ref 70–99)
HCT VFR BLD CALC: 33.2 % — LOW (ref 39–50)
HGB BLD-MCNC: 11.4 G/DL — LOW (ref 13–17)
MAGNESIUM SERPL-MCNC: 2.2 MG/DL — SIGNIFICANT CHANGE UP (ref 1.6–2.6)
MCHC RBC-ENTMCNC: 28.2 PG — SIGNIFICANT CHANGE UP (ref 27–34)
MCHC RBC-ENTMCNC: 34.3 GM/DL — SIGNIFICANT CHANGE UP (ref 32–36)
MCV RBC AUTO: 82.2 FL — SIGNIFICANT CHANGE UP (ref 80–100)
PHOSPHATE SERPL-MCNC: 2.7 MG/DL — SIGNIFICANT CHANGE UP (ref 2.5–4.5)
PLATELET # BLD AUTO: 235 K/UL — SIGNIFICANT CHANGE UP (ref 150–400)
POTASSIUM SERPL-MCNC: 4 MMOL/L — SIGNIFICANT CHANGE UP (ref 3.5–5.3)
POTASSIUM SERPL-SCNC: 4 MMOL/L — SIGNIFICANT CHANGE UP (ref 3.5–5.3)
RBC # BLD: 4.04 M/UL — LOW (ref 4.2–5.8)
RBC # FLD: 13 % — SIGNIFICANT CHANGE UP (ref 10.3–14.5)
SODIUM SERPL-SCNC: 137 MMOL/L — SIGNIFICANT CHANGE UP (ref 135–145)
WBC # BLD: 9.86 K/UL — SIGNIFICANT CHANGE UP (ref 3.8–10.5)
WBC # FLD AUTO: 9.86 K/UL — SIGNIFICANT CHANGE UP (ref 3.8–10.5)

## 2018-09-06 RX ORDER — SIMETHICONE 80 MG/1
80 TABLET, CHEWABLE ORAL
Qty: 0 | Refills: 0 | Status: DISCONTINUED | OUTPATIENT
Start: 2018-09-06 | End: 2018-09-07

## 2018-09-06 RX ORDER — ACETAMINOPHEN 500 MG
975 TABLET ORAL EVERY 6 HOURS
Qty: 0 | Refills: 0 | Status: DISCONTINUED | OUTPATIENT
Start: 2018-09-06 | End: 2018-09-07

## 2018-09-06 RX ORDER — ONDANSETRON 8 MG/1
4 TABLET, FILM COATED ORAL EVERY 6 HOURS
Qty: 0 | Refills: 0 | Status: DISCONTINUED | OUTPATIENT
Start: 2018-09-06 | End: 2018-09-07

## 2018-09-06 RX ORDER — OLMESARTAN MEDOXOMIL 5 MG/1
20 TABLET, FILM COATED ORAL DAILY
Qty: 0 | Refills: 0 | Status: DISCONTINUED | OUTPATIENT
Start: 2018-09-06 | End: 2018-09-07

## 2018-09-06 RX ORDER — AMLODIPINE BESYLATE 2.5 MG/1
5 TABLET ORAL DAILY
Qty: 0 | Refills: 0 | Status: DISCONTINUED | OUTPATIENT
Start: 2018-09-06 | End: 2018-09-07

## 2018-09-06 RX ORDER — SODIUM CHLORIDE 9 MG/ML
1000 INJECTION, SOLUTION INTRAVENOUS
Qty: 0 | Refills: 0 | Status: DISCONTINUED | OUTPATIENT
Start: 2018-09-06 | End: 2018-09-07

## 2018-09-06 RX ORDER — HYDRALAZINE HCL 50 MG
10 TABLET ORAL ONCE
Qty: 0 | Refills: 0 | Status: COMPLETED | OUTPATIENT
Start: 2018-09-06 | End: 2018-09-06

## 2018-09-06 RX ORDER — DEXTROSE MONOHYDRATE, SODIUM CHLORIDE, AND POTASSIUM CHLORIDE 50; .745; 4.5 G/1000ML; G/1000ML; G/1000ML
1000 INJECTION, SOLUTION INTRAVENOUS
Qty: 0 | Refills: 0 | Status: DISCONTINUED | OUTPATIENT
Start: 2018-09-06 | End: 2018-09-06

## 2018-09-06 RX ORDER — IBUPROFEN 200 MG
600 TABLET ORAL EVERY 6 HOURS
Qty: 0 | Refills: 0 | Status: DISCONTINUED | OUTPATIENT
Start: 2018-09-06 | End: 2018-09-07

## 2018-09-06 RX ADMIN — DEXTROSE MONOHYDRATE, SODIUM CHLORIDE, AND POTASSIUM CHLORIDE 75 MILLILITER(S): 50; .745; 4.5 INJECTION, SOLUTION INTRAVENOUS at 16:07

## 2018-09-06 RX ADMIN — Medication 600 MILLIGRAM(S): at 12:13

## 2018-09-06 RX ADMIN — Medication 600 MILLIGRAM(S): at 23:14

## 2018-09-06 RX ADMIN — Medication 600 MILLIGRAM(S): at 18:37

## 2018-09-06 RX ADMIN — SIMETHICONE 80 MILLIGRAM(S): 80 TABLET, CHEWABLE ORAL at 10:31

## 2018-09-06 RX ADMIN — Medication 975 MILLIGRAM(S): at 22:16

## 2018-09-06 RX ADMIN — Medication 975 MILLIGRAM(S): at 09:31

## 2018-09-06 RX ADMIN — AMLODIPINE BESYLATE 5 MILLIGRAM(S): 2.5 TABLET ORAL at 00:43

## 2018-09-06 RX ADMIN — Medication 600 MILLIGRAM(S): at 23:44

## 2018-09-06 RX ADMIN — OLMESARTAN MEDOXOMIL 20 MILLIGRAM(S): 5 TABLET, FILM COATED ORAL at 00:43

## 2018-09-06 RX ADMIN — ONDANSETRON 4 MILLIGRAM(S): 8 TABLET, FILM COATED ORAL at 07:17

## 2018-09-06 RX ADMIN — Medication 975 MILLIGRAM(S): at 16:08

## 2018-09-06 RX ADMIN — ENOXAPARIN SODIUM 40 MILLIGRAM(S): 100 INJECTION SUBCUTANEOUS at 12:12

## 2018-09-06 RX ADMIN — Medication 975 MILLIGRAM(S): at 21:46

## 2018-09-06 RX ADMIN — Medication 10 MILLIGRAM(S): at 21:00

## 2018-09-06 NOTE — PROGRESS NOTE ADULT - ASSESSMENT
Assessment:73y Male s/p ERP Laparoscopic low anterior resection    Plan:  -  Pain control  -  Nausea control  -  c/w CLD  -  IVF@40   -  ERP protocol  -  f/u AM labs  -  Monitor vital signs  -  Ambulate as tolerated      Green [phone]

## 2018-09-06 NOTE — PROVIDER CONTACT NOTE (OTHER) - ASSESSMENT
Pt c/o some abdominal pain and also states he feels stressed about the surgery and recovery process in general. Pt does not want to take oxycodone, only wants OTC meds (Tylenol & Motrin).

## 2018-09-06 NOTE — PROGRESS NOTE ADULT - ATTENDING COMMENTS
I have seen and evaluated patient, and I have discussed with team. Patient has had bowel movements, but has had nausea. Discharge home when nausea resolves and able to tolerate diet with ongoing bowel function.

## 2018-09-06 NOTE — PROGRESS NOTE ADULT - SUBJECTIVE AND OBJECTIVE BOX
SURGERY DAILY PROGRESS NOTE:       SUBJECTIVE/ROS: Patient examined at bedside. No acute events overnight. Tolerates diet w/o N/V. H&H stable.          MEDICATIONS  (STANDING):  amLODIPine   Tablet 5 milliGRAM(s) Oral daily  enoxaparin Injectable 40 milliGRAM(s) SubCutaneous daily  influenza   Vaccine 0.5 milliLiter(s) IntraMuscular once  olmesartan 20 milliGRAM(s) Oral daily    MEDICATIONS  (PRN):  oxyCODONE    IR 5 milliGRAM(s) Oral every 4 hours PRN Moderate Pain (4 - 6)  oxyCODONE    IR 10 milliGRAM(s) Oral every 4 hours PRN Severe Pain (7 - 10)      OBJECTIVE:    Vital Signs Last 24 Hrs  T(C): 36.7 (06 Sep 2018 00:49), Max: 37.3 (05 Sep 2018 09:23)  T(F): 98 (06 Sep 2018 00:49), Max: 99.1 (05 Sep 2018 09:23)  HR: 76 (06 Sep 2018 00:49) (60 - 76)  BP: 176/77 (06 Sep 2018 02:01) (108/67 - 196/81)  BP(mean): --  RR: 18 (06 Sep 2018 00:49) (18 - 18)  SpO2: 97% (06 Sep 2018 00:49) (94% - 100%)        I&O's Detail    04 Sep 2018 07:01  -  05 Sep 2018 07:00  --------------------------------------------------------  IN:    IV PiggyBack: 200 mL    lactated ringers.: 660 mL    Oral Fluid: 200 mL  Total IN: 1060 mL    OUT:    Indwelling Catheter - Urethral: 620 mL  Total OUT: 620 mL    Total NET: 440 mL      05 Sep 2018 07:01  -  06 Sep 2018 05:47  --------------------------------------------------------  IN:    IV PiggyBack: 200 mL    lactated ringers.: 160 mL    Oral Fluid: 360 mL  Total IN: 720 mL    OUT:    Indwelling Catheter - Urethral: 800 mL    Voided: 700 mL  Total OUT: 1500 mL    Total NET: -780 mL          Daily     Daily     LABS:                        11.1   9.9   )-----------( 202      ( 05 Sep 2018 15:47 )             32.6     09-05    139  |  104  |  14  ----------------------------<  142<H>  3.9   |  23  |  1.17    Ca    8.1<L>      05 Sep 2018 12:36  Phos  2.8     09-05  Mg     2.2     09-05                    PHYSICAL EXAM:  GEN: NAD  Pulm: unlabored breathing on RA  CV: radial pulse 2+, cap refil <2sec  Abd: soft, nontender, nondistedned, incision CDI

## 2018-09-06 NOTE — PROVIDER CONTACT NOTE (OTHER) - SITUATION
RN was instructed to re-check pt's blood pressure since pressures have been running high. /71, all other VSS.

## 2018-09-07 VITALS
RESPIRATION RATE: 18 BRPM | HEART RATE: 69 BPM | SYSTOLIC BLOOD PRESSURE: 146 MMHG | DIASTOLIC BLOOD PRESSURE: 73 MMHG | OXYGEN SATURATION: 98 % | TEMPERATURE: 98 F

## 2018-09-07 LAB
ANION GAP SERPL CALC-SCNC: 13 MMOL/L — SIGNIFICANT CHANGE UP (ref 5–17)
BUN SERPL-MCNC: 11 MG/DL — SIGNIFICANT CHANGE UP (ref 7–23)
CALCIUM SERPL-MCNC: 8.6 MG/DL — SIGNIFICANT CHANGE UP (ref 8.4–10.5)
CHLORIDE SERPL-SCNC: 107 MMOL/L — SIGNIFICANT CHANGE UP (ref 96–108)
CO2 SERPL-SCNC: 22 MMOL/L — SIGNIFICANT CHANGE UP (ref 22–31)
CREAT SERPL-MCNC: 0.95 MG/DL — SIGNIFICANT CHANGE UP (ref 0.5–1.3)
GLUCOSE SERPL-MCNC: 114 MG/DL — HIGH (ref 70–99)
HCT VFR BLD CALC: 32.2 % — LOW (ref 39–50)
HGB BLD-MCNC: 10.5 G/DL — LOW (ref 13–17)
MAGNESIUM SERPL-MCNC: 2.3 MG/DL — SIGNIFICANT CHANGE UP (ref 1.6–2.6)
MCHC RBC-ENTMCNC: 26.8 PG — LOW (ref 27–34)
MCHC RBC-ENTMCNC: 32.6 GM/DL — SIGNIFICANT CHANGE UP (ref 32–36)
MCV RBC AUTO: 82.1 FL — SIGNIFICANT CHANGE UP (ref 80–100)
PHOSPHATE SERPL-MCNC: 3 MG/DL — SIGNIFICANT CHANGE UP (ref 2.5–4.5)
PLATELET # BLD AUTO: 234 K/UL — SIGNIFICANT CHANGE UP (ref 150–400)
POTASSIUM SERPL-MCNC: 3.8 MMOL/L — SIGNIFICANT CHANGE UP (ref 3.5–5.3)
POTASSIUM SERPL-SCNC: 3.8 MMOL/L — SIGNIFICANT CHANGE UP (ref 3.5–5.3)
RBC # BLD: 3.92 M/UL — LOW (ref 4.2–5.8)
RBC # FLD: 13.3 % — SIGNIFICANT CHANGE UP (ref 10.3–14.5)
SODIUM SERPL-SCNC: 142 MMOL/L — SIGNIFICANT CHANGE UP (ref 135–145)
WBC # BLD: 8.4 K/UL — SIGNIFICANT CHANGE UP (ref 3.8–10.5)
WBC # FLD AUTO: 8.4 K/UL — SIGNIFICANT CHANGE UP (ref 3.8–10.5)

## 2018-09-07 PROCEDURE — 82962 GLUCOSE BLOOD TEST: CPT

## 2018-09-07 PROCEDURE — 83735 ASSAY OF MAGNESIUM: CPT

## 2018-09-07 PROCEDURE — 85027 COMPLETE CBC AUTOMATED: CPT

## 2018-09-07 PROCEDURE — 84100 ASSAY OF PHOSPHORUS: CPT

## 2018-09-07 PROCEDURE — C1758: CPT

## 2018-09-07 PROCEDURE — 88313 SPECIAL STAINS GROUP 2: CPT

## 2018-09-07 PROCEDURE — 80048 BASIC METABOLIC PNL TOTAL CA: CPT

## 2018-09-07 PROCEDURE — C1889: CPT

## 2018-09-07 PROCEDURE — 88341 IMHCHEM/IMCYTCHM EA ADD ANTB: CPT

## 2018-09-07 PROCEDURE — 88304 TISSUE EXAM BY PATHOLOGIST: CPT

## 2018-09-07 PROCEDURE — 88309 TISSUE EXAM BY PATHOLOGIST: CPT

## 2018-09-07 PROCEDURE — 88342 IMHCHEM/IMCYTCHM 1ST ANTB: CPT

## 2018-09-07 RX ORDER — ENOXAPARIN SODIUM 100 MG/ML
40 INJECTION SUBCUTANEOUS
Qty: 1200 | Refills: 0 | OUTPATIENT
Start: 2018-09-07 | End: 2018-10-06

## 2018-09-07 RX ORDER — ASPIRIN/CALCIUM CARB/MAGNESIUM 324 MG
1 TABLET ORAL
Qty: 0 | Refills: 0 | COMMUNITY

## 2018-09-07 RX ORDER — POTASSIUM CHLORIDE 20 MEQ
20 PACKET (EA) ORAL ONCE
Qty: 0 | Refills: 0 | Status: COMPLETED | OUTPATIENT
Start: 2018-09-07 | End: 2018-09-07

## 2018-09-07 RX ORDER — APIXABAN 2.5 MG/1
1 TABLET, FILM COATED ORAL
Qty: 60 | Refills: 0 | OUTPATIENT
Start: 2018-09-07 | End: 2018-10-06

## 2018-09-07 RX ADMIN — Medication 975 MILLIGRAM(S): at 06:13

## 2018-09-07 RX ADMIN — Medication 20 MILLIEQUIVALENT(S): at 09:55

## 2018-09-07 RX ADMIN — Medication 600 MILLIGRAM(S): at 06:13

## 2018-09-07 RX ADMIN — ENOXAPARIN SODIUM 40 MILLIGRAM(S): 100 INJECTION SUBCUTANEOUS at 12:37

## 2018-09-07 RX ADMIN — Medication 600 MILLIGRAM(S): at 13:07

## 2018-09-07 RX ADMIN — AMLODIPINE BESYLATE 5 MILLIGRAM(S): 2.5 TABLET ORAL at 06:13

## 2018-09-07 RX ADMIN — Medication 975 MILLIGRAM(S): at 09:55

## 2018-09-07 RX ADMIN — SIMETHICONE 80 MILLIGRAM(S): 80 TABLET, CHEWABLE ORAL at 06:13

## 2018-09-07 RX ADMIN — OLMESARTAN MEDOXOMIL 20 MILLIGRAM(S): 5 TABLET, FILM COATED ORAL at 06:13

## 2018-09-07 RX ADMIN — Medication 975 MILLIGRAM(S): at 10:25

## 2018-09-07 RX ADMIN — Medication 600 MILLIGRAM(S): at 12:37

## 2018-09-07 NOTE — PROGRESS NOTE ADULT - ASSESSMENT
Assessment:73y Male s/p ERP Laparoscopic low anterior resection. On HD3 became distended, no GI fxn.     Plan:  -  Pain control  -  Nausea control  -  Hold CLD given that nauseated and distended   -  IVF@70 while NPO or poor PO itnake   -  ERP protocol( Motrin/Tylenol for pain)  -  f/u AM labs  -  Monitor vital signs  -  Ambulate as tolerated      Green [phone]

## 2018-09-07 NOTE — PROGRESS NOTE ADULT - ATTENDING COMMENTS
I have seen and evaluated patient and discussed with team.  Nausea resolving, less distended.  LRD and if tolerated with increase bowel function then DC home

## 2018-09-07 NOTE — PROVIDER CONTACT NOTE (OTHER) - ACTION/TREATMENT ORDERED:
MD aware. Amlodipine and omersatan po given for BP. Will recheck BP.
MD notified, 10mg IV hydralazine ordered and given with resulting BP of 159/70, MD notified, no further instructions given, will continue to monitor patient.
MD notified, instructed to recheck in 1 hour, will continue to monitor.
hydralazine

## 2018-09-07 NOTE — PROGRESS NOTE ADULT - SUBJECTIVE AND OBJECTIVE BOX
SURGERY DAILY PROGRESS NOTE:       SUBJECTIVE/ROS: Patient examined at bedside. No acute events overnight. Continues to be nauseated. Persistently hypertensive to ; was given Hydralazine once with a good response however pt reposts being anxious about his hospital stay which most likely contributes to his HTN. NO GI fxn yet.          MEDICATIONS  (STANDING):  acetaminophen   Tablet .. 975 milliGRAM(s) Oral every 6 hours  amLODIPine   Tablet 5 milliGRAM(s) Oral daily  enoxaparin Injectable 40 milliGRAM(s) SubCutaneous daily  ibuprofen  Tablet. 600 milliGRAM(s) Oral every 6 hours  influenza   Vaccine 0.5 milliLiter(s) IntraMuscular once  lactated ringers. 1000 milliLiter(s) (70 mL/Hr) IV Continuous <Continuous>  olmesartan 20 milliGRAM(s) Oral daily  simethicone 80 milliGRAM(s) Chew two times a day    MEDICATIONS  (PRN):  ondansetron Injectable 4 milliGRAM(s) IV Push every 6 hours PRN Nausea and/or Vomiting  oxyCODONE    IR 5 milliGRAM(s) Oral every 4 hours PRN Moderate Pain (4 - 6)  oxyCODONE    IR 10 milliGRAM(s) Oral every 4 hours PRN Severe Pain (7 - 10)      OBJECTIVE:    Vital Signs Last 24 Hrs  T(C): 37.1 (07 Sep 2018 00:32), Max: 37.1 (07 Sep 2018 00:32)  T(F): 98.7 (07 Sep 2018 00:32), Max: 98.7 (07 Sep 2018 00:32)  HR: 82 (07 Sep 2018 01:31) (68 - 82)  BP: 185/99 (07 Sep 2018 01:31) (159/70 - 185/99)  BP(mean): --  RR: 18 (07 Sep 2018 00:32) (18 - 97)  SpO2: 97% (07 Sep 2018 00:32) (97% - 99%)        I&O's Detail    05 Sep 2018 07:01  -  06 Sep 2018 07:00  --------------------------------------------------------  IN:    IV PiggyBack: 200 mL    lactated ringers.: 160 mL    Oral Fluid: 360 mL  Total IN: 720 mL    OUT:    Indwelling Catheter - Urethral: 800 mL    Voided: 1000 mL  Total OUT: 1800 mL    Total NET: -1080 mL      06 Sep 2018 07:01  -  07 Sep 2018 02:34  --------------------------------------------------------  IN:    dextrose 5% + sodium chloride 0.45% with potassium chloride 20 mEq/L: 75 mL    Oral Fluid: 1140 mL  Total IN: 1215 mL    OUT:    Voided: 850 mL  Total OUT: 850 mL    Total NET: 365 mL          Daily     Daily     LABS:                        11.4   9.86  )-----------( 235      ( 06 Sep 2018 09:19 )             33.2     09-06    137  |  104  |  12  ----------------------------<  135<H>  4.0   |  21<L>  |  0.91    Ca    8.3<L>      06 Sep 2018 07:11  Phos  2.7     09-06  Mg     2.2     09-06          PHYSICAL EXAM:  GEN: NAD  Pulm: unlabored breathing on RA  CV: radial pulse 2+, cap refil <2sec  Abd: soft, moderately tender, distended incision CDI

## 2018-09-10 PROBLEM — C18.9 MALIGNANT NEOPLASM OF COLON, UNSPECIFIED: Chronic | Status: ACTIVE | Noted: 2018-08-29

## 2018-09-10 PROBLEM — Z85.048 HISTORY OF MALIGNANT NEOPLASM OF RECTUM: Status: RESOLVED | Noted: 2018-08-09 | Resolved: 2018-09-10

## 2018-09-10 PROBLEM — I10 ESSENTIAL (PRIMARY) HYPERTENSION: Chronic | Status: ACTIVE | Noted: 2018-08-29

## 2018-09-10 RX ORDER — CIPROFLOXACIN HYDROCHLORIDE 250 MG/1
250 TABLET, FILM COATED ORAL
Qty: 3 | Refills: 0 | Status: DISCONTINUED | COMMUNITY
Start: 2018-08-28 | End: 2018-09-10

## 2018-09-10 RX ORDER — METRONIDAZOLE 250 MG/1
250 TABLET ORAL
Qty: 3 | Refills: 0 | Status: DISCONTINUED | COMMUNITY
Start: 2018-08-28 | End: 2018-09-10

## 2018-09-13 LAB — SURGICAL PATHOLOGY STUDY: SIGNIFICANT CHANGE UP

## 2018-09-26 ENCOUNTER — APPOINTMENT (OUTPATIENT)
Dept: SURGERY | Facility: CLINIC | Age: 73
End: 2018-09-26
Payer: MEDICARE

## 2018-09-26 VITALS
RESPIRATION RATE: 16 BRPM | SYSTOLIC BLOOD PRESSURE: 159 MMHG | DIASTOLIC BLOOD PRESSURE: 75 MMHG | HEART RATE: 82 BPM | TEMPERATURE: 98.9 F | OXYGEN SATURATION: 100 %

## 2018-09-26 PROCEDURE — 99024 POSTOP FOLLOW-UP VISIT: CPT

## 2018-09-26 RX ORDER — ASPIRIN 81 MG
81 TABLET, DELAYED RELEASE (ENTERIC COATED) ORAL
Refills: 0 | Status: DISCONTINUED | COMMUNITY
End: 2018-09-26

## 2018-10-15 ENCOUNTER — APPOINTMENT (OUTPATIENT)
Dept: ENDOVASCULAR SURGERY | Facility: CLINIC | Age: 73
End: 2018-10-15
Payer: MEDICARE

## 2018-10-15 PROCEDURE — 77001 FLUOROGUIDE FOR VEIN DEVICE: CPT

## 2018-10-15 PROCEDURE — 76937 US GUIDE VASCULAR ACCESS: CPT

## 2018-10-15 PROCEDURE — 36561 INSERT TUNNELED CV CATH: CPT

## 2018-10-17 ENCOUNTER — APPOINTMENT (OUTPATIENT)
Dept: UROLOGY | Facility: CLINIC | Age: 73
End: 2018-10-17

## 2018-11-13 PROBLEM — Z09 POSTOPERATIVE EXAMINATION: Status: ACTIVE | Noted: 2018-09-10

## 2018-11-14 ENCOUNTER — APPOINTMENT (OUTPATIENT)
Dept: SURGERY | Facility: CLINIC | Age: 73
End: 2018-11-14
Payer: MEDICARE

## 2018-11-14 VITALS
TEMPERATURE: 98.4 F | SYSTOLIC BLOOD PRESSURE: 161 MMHG | HEART RATE: 63 BPM | OXYGEN SATURATION: 98 % | DIASTOLIC BLOOD PRESSURE: 78 MMHG | RESPIRATION RATE: 16 BRPM

## 2018-11-14 DIAGNOSIS — Z09 ENCOUNTER FOR FOLLOW-UP EXAMINATION AFTER COMPLETED TREATMENT FOR CONDITIONS OTHER THAN MALIGNANT NEOPLASM: ICD-10-CM

## 2018-11-14 PROCEDURE — 99024 POSTOP FOLLOW-UP VISIT: CPT

## 2018-11-14 RX ORDER — HYDROCHLOROTHIAZIDE 25 MG/1
25 TABLET ORAL
Refills: 0 | Status: DISCONTINUED | COMMUNITY
End: 2018-11-14

## 2018-11-14 RX ORDER — MV-MIN/VIT C/GLUT/LYSINE/HB124 250-12.5MG
TABLET,CHEWABLE ORAL
Refills: 0 | Status: ACTIVE | COMMUNITY

## 2018-11-14 RX ORDER — APIXABAN 5 MG/1
TABLET, FILM COATED ORAL
Refills: 0 | Status: DISCONTINUED | COMMUNITY
End: 2018-11-14

## 2018-11-14 RX ORDER — OMEGA-3/DHA/EPA/FISH OIL 910-1400MG
CAPSULE ORAL
Refills: 0 | Status: DISCONTINUED | COMMUNITY
End: 2018-11-14

## 2018-11-14 RX ORDER — CHOLECALCIFEROL (VITAMIN D3) 25 MCG
TABLET ORAL
Refills: 0 | Status: ACTIVE | COMMUNITY

## 2019-04-17 ENCOUNTER — APPOINTMENT (OUTPATIENT)
Age: 74
End: 2019-04-17
Payer: MEDICARE

## 2019-04-17 ENCOUNTER — OUTPATIENT (OUTPATIENT)
Dept: OUTPATIENT SERVICES | Facility: HOSPITAL | Age: 74
LOS: 1 days | End: 2019-04-17
Payer: MEDICARE

## 2019-04-17 DIAGNOSIS — C18.6 MALIGNANT NEOPLASM OF DESCENDING COLON: ICD-10-CM

## 2019-04-17 DIAGNOSIS — Z87.19 PERSONAL HISTORY OF OTHER DISEASES OF THE DIGESTIVE SYSTEM: Chronic | ICD-10-CM

## 2019-04-17 DIAGNOSIS — S86.019A STRAIN OF UNSPECIFIED ACHILLES TENDON, INITIAL ENCOUNTER: Chronic | ICD-10-CM

## 2019-04-17 PROCEDURE — 74177 CT ABD & PELVIS W/CONTRAST: CPT | Mod: 26

## 2019-04-17 PROCEDURE — 71260 CT THORAX DX C+: CPT

## 2019-04-17 PROCEDURE — 74177 CT ABD & PELVIS W/CONTRAST: CPT

## 2019-04-17 PROCEDURE — 71260 CT THORAX DX C+: CPT | Mod: 26

## 2019-04-17 PROCEDURE — 82565 ASSAY OF CREATININE: CPT

## 2019-12-06 PROBLEM — Z85.038: Status: RESOLVED | Noted: 2018-09-10 | Resolved: 2019-12-06

## 2019-12-09 ENCOUNTER — APPOINTMENT (OUTPATIENT)
Dept: ENDOVASCULAR SURGERY | Facility: CLINIC | Age: 74
End: 2019-12-09
Payer: MEDICARE

## 2019-12-09 VITALS
HEART RATE: 67 BPM | RESPIRATION RATE: 16 BRPM | TEMPERATURE: 98.1 F | DIASTOLIC BLOOD PRESSURE: 84 MMHG | SYSTOLIC BLOOD PRESSURE: 164 MMHG | BODY MASS INDEX: 26.04 KG/M2 | HEIGHT: 71 IN | OXYGEN SATURATION: 99 % | WEIGHT: 186 LBS

## 2019-12-09 DIAGNOSIS — Z85.038 PERSONAL HISTORY OF OTHER MALIGNANT NEOPLASM OF LARGE INTESTINE: ICD-10-CM

## 2019-12-09 PROCEDURE — 77001 FLUOROGUIDE FOR VEIN DEVICE: CPT

## 2019-12-09 PROCEDURE — 36590 REMOVAL TUNNELED CV CATH: CPT

## 2019-12-09 RX ORDER — AMLODIPINE BESYLATE AND OLMESARTAN MEDOXOMIL 5; 20 MG/1; MG/1
5-20 TABLET, FILM COATED ORAL
Refills: 0 | Status: DISCONTINUED | COMMUNITY
End: 2019-12-09

## 2019-12-09 RX ORDER — AMLODIPINE BESYLATE 10 MG/1
10 TABLET ORAL
Refills: 0 | Status: ACTIVE | COMMUNITY

## 2019-12-23 NOTE — HISTORY OF PRESENT ILLNESS
[FreeTextEntry1] : alert and oriented x 3 \par accompanied by wife\par no reported falls \par no meds today [FreeTextEntry5] : yesterday at [FreeTextEntry6] : Dr Dove

## 2019-12-23 NOTE — PROCEDURE
[Resume diet] : resume diet [D/C IV on discharge] : D/C IV on discharge [Vital signs on admission the q 15 mins x2] : Vital signs on admission the q 15 mins x2 [Site check for bleeding/hematoma] : Site check for bleeding/hematoma [FreeTextEntry1] : right mediport removal

## 2019-12-23 NOTE — PAST MEDICAL HISTORY
[FreeTextEntry1] : Malignant Hyperthermia Screening Tool and Risk of Bleeding Assessment \par \par Mr. AKIL MARTINEZ denies family of unexpected death following Anesthesia or Exercise.\par Denies Family history of Malignant Hyperthermia, Muscle or Neuromuscular disorder and High Temperature  following exercise.\par \par Mr. AKIL MARTINEZ denies history of Muscle Spasm, Dark or Chocolate- Colored and Unanticipated fever immediately following anaesthesia or serious exercise.\par Mr. AKIL MARTINEZ also denies bleeding tendencies/Risks of Bleeding.\par

## 2020-07-05 DIAGNOSIS — Z01.818 ENCOUNTER FOR OTHER PREPROCEDURAL EXAMINATION: ICD-10-CM

## 2020-07-06 ENCOUNTER — APPOINTMENT (OUTPATIENT)
Dept: DISASTER EMERGENCY | Facility: CLINIC | Age: 75
End: 2020-07-06

## 2020-07-06 LAB — SARS-COV-2 N GENE NPH QL NAA+PROBE: NOT DETECTED

## 2020-07-17 ENCOUNTER — OUTPATIENT (OUTPATIENT)
Dept: OUTPATIENT SERVICES | Facility: HOSPITAL | Age: 75
LOS: 1 days | End: 2020-07-17
Payer: MEDICARE

## 2020-07-17 ENCOUNTER — APPOINTMENT (OUTPATIENT)
Dept: CT IMAGING | Facility: CLINIC | Age: 75
End: 2020-07-17

## 2020-07-17 DIAGNOSIS — S86.019A STRAIN OF UNSPECIFIED ACHILLES TENDON, INITIAL ENCOUNTER: Chronic | ICD-10-CM

## 2020-07-17 DIAGNOSIS — Z00.8 ENCOUNTER FOR OTHER GENERAL EXAMINATION: ICD-10-CM

## 2020-07-17 DIAGNOSIS — Z87.19 PERSONAL HISTORY OF OTHER DISEASES OF THE DIGESTIVE SYSTEM: Chronic | ICD-10-CM

## 2020-07-17 PROCEDURE — 74177 CT ABD & PELVIS W/CONTRAST: CPT | Mod: 26

## 2020-07-17 PROCEDURE — 74177 CT ABD & PELVIS W/CONTRAST: CPT

## 2020-07-17 PROCEDURE — 82565 ASSAY OF CREATININE: CPT

## 2020-07-31 ENCOUNTER — APPOINTMENT (OUTPATIENT)
Dept: CT IMAGING | Facility: CLINIC | Age: 75
End: 2020-07-31
Payer: MEDICARE

## 2020-07-31 ENCOUNTER — OUTPATIENT (OUTPATIENT)
Dept: OUTPATIENT SERVICES | Facility: HOSPITAL | Age: 75
LOS: 1 days | End: 2020-07-31
Payer: MEDICARE

## 2020-07-31 DIAGNOSIS — C18.7 MALIGNANT NEOPLASM OF SIGMOID COLON: ICD-10-CM

## 2020-07-31 DIAGNOSIS — Z87.19 PERSONAL HISTORY OF OTHER DISEASES OF THE DIGESTIVE SYSTEM: Chronic | ICD-10-CM

## 2020-07-31 DIAGNOSIS — S86.019A STRAIN OF UNSPECIFIED ACHILLES TENDON, INITIAL ENCOUNTER: Chronic | ICD-10-CM

## 2020-07-31 PROCEDURE — 71250 CT THORAX DX C-: CPT | Mod: 26

## 2020-07-31 PROCEDURE — 71250 CT THORAX DX C-: CPT

## 2022-01-07 ENCOUNTER — TRANSCRIPTION ENCOUNTER (OUTPATIENT)
Age: 77
End: 2022-01-07

## 2022-02-25 NOTE — PATIENT PROFILE ADULT. - DOES PATIENT HAVE ADVANCE DIRECTIVE
Your home care company is [employer]: [phone]  Please contact [employer] for all questions related to supplies and your CPAP machine.    CPAP/BiPAP TIPS     Please be advised:   • Do not drive while sleepy   • Take CPAP/BiPAP machine to any procedure that requires sedation  When should I clean my machine & supplies?  DAILY  • Wipe mask cushions or nasal pillow   • Empty & rinse water chamber- refill with distilled water  WEEKLY  • Clean mask cushions or nasal pillow, headgear, tubing, and humidifier chamber with mild soap (Lani) and water   • If water chamber has hard water buildup (white crust), soak in warm water & vinegar mix 50/50.  • Rinse and hang dry    When should supplies be replaced?  Contact your home care company for replacement supplies, or if your machine is malfunctioning  *Below is a general guideline of what we recommend. Replacement of supplies differs depending on your insurance company*  MONTHLY: Replace filter and mask cushion  3-6 MONTHS: Replace headgear and tubing    Travel Tips  • Keep CPAP/BiPAP in original bag when traveling, and place luggage tag on bag  • Most airlines consider CPAP/BiPAP to be a medical device, therefore it is a free carry-on item  • If unable to get distilled water, bottled water is safe while traveling. DO NOT use tap water  • When traveling outside the [place] only a power adapter is necessary (CPAP can operate without a converter), bring an extension cord  • Consider purchasing or renting a travel CPAP (not covered by insurance)  Dry Mouth/Nose  • Turn up the humidity on your machine (select “Options” from the home screen)  • Place a cool mist humidifier at your bedside  • Over-the-counter remedies: Biotene, XyliMelts, NasoGel   Air Leak  • Try adjusting your mask/headgear while laying in sleeping position vs. sitting up  • Wash and dry your face prior to putting your mask on  • If applicable: shave facial hair at night (or before wearing  CPAP)  • Purchase “RemZzzs” (through home care co., [email or web address], or [email or web address])  o 100% cotton knit barrier that goes between your mask cushion and your skin  • Replace your mask cushion (at least once per month) and/or headgear (every 3-6 months)  Nasal Congestion  • CPAP therapy can cause nasal passages to dry out, & mucous membranes try to protect the nasal passages by producing excess mucous, so congestion results.  • Over-the counter remedies: Flonase, Nasacort, Sinus Rinses (Neti-Pot), DO NOT USE Afrin  • Try a mask that goes over the nose and mouth  Skin Irritation  • Clean supplies regularly (Citrus II Mask Wipes, Control III disinfectant solution)  • Try over the counter creams such as hydrocortisone 1% (apply in the morning after showering)  • Your headgear may be too tight, replace supplies so you don't need to adjust so tightly  • Try RemZzzs (100% cotton knit barrier that goes between your mask cushion and your skin)  Gas/Bloating  • Try a different sleeping position to keep air out of the stomach. Lay on the left side or rotate to the right side. Incline with pillows or lay flat.  • Over-the-counter remedies: Simethicone     wife ana  [phone]

## 2022-04-18 ENCOUNTER — APPOINTMENT (OUTPATIENT)
Dept: OTOLARYNGOLOGY | Facility: CLINIC | Age: 77
End: 2022-04-18

## 2022-05-26 NOTE — PROVIDER CONTACT NOTE (OTHER) - DATE AND TIME:
06-Sep-2018 00:22 [4 x 4] : 4 x 4  [Abdominal Pad] : Abdominal Pad [0] : left 0 [1+] : left 1+ [Varicose Veins Of Lower Extremities] : not present [] : not present [Purpura] : no purpura  [Petechiae] : no petechiae [Skin Ulcer] : ulcer [Skin Induration] : no induration [Alert] : alert [Oriented to Person] : oriented to person [Oriented to Place] : oriented to place [Oriented to Time] : oriented to time [Calm] : calm [de-identified] : adult WM, NAD, alert, Ox 3. [de-identified] : Diabetic Charcot right foot deformity [de-identified] : right foot plantar ulcer 5th metatarsal healing well sutures removed , head of 5th metatarsal healing well , smaller and closing  [de-identified] : Diabetic neuropathy  [FreeTextEntry1] : Right Foot S/P 5th Met Amp Site - Sutures intact- small dehisced  measured  [FreeTextEntry2] : 1.5 [FreeTextEntry3] : 2.6 [FreeTextEntry4] : 0.2 [de-identified] :  serosanguineous [de-identified] : Callus  [de-identified] : 10% [de-identified] : Pt expressed comfort post ACE application. Circ/Neuromuscular functions WNL post application. [de-identified] : Alginate Ag [de-identified] : Mechanically cleansed with sterile gauze and normal saline 0.9%\par Kerlix \par \par * Sutures removed by DPM  [FreeTextEntry7] : Right Plantar Foot Ulcer [FreeTextEntry8] : 2 [FreeTextEntry9] : 2.1 [de-identified] : 0.2 [de-identified] : serosanguineous [de-identified] : callused - DPM shaved callus [de-identified] : Pt expressed comfort post ACE application. Circ/Neuromuscular functions WNL post application. [de-identified] : Alginate Ag [de-identified] : Mechanically cleansed with sterile gauze and normal saline 0.9%\par Kerlix \par  [de-identified] : Right lateral distal foot  [de-identified] : 0.6 [de-identified] : 0.7 [de-identified] : 0.1 [de-identified] : Serous/sanguinous [de-identified] : Callus  [de-identified] : Expressed comfort post ACE application. [de-identified] : Silver alginate [de-identified] : Mechanically cleansed with sterile gauze and normal saline.\par Kerlix  [TWNoteComboBox4] : Small [de-identified] : other [de-identified] : None [de-identified] : None [de-identified] : >75% [de-identified] : Yes [de-identified] : Ace wraps [de-identified] : Every other day [de-identified] : Primary Dressing [de-identified] : Small [de-identified] : other [de-identified] : None [de-identified] : None [de-identified] : 100% [de-identified] : No [de-identified] : Ace wraps [de-identified] : Every other day [de-identified] : Primary Dressing [de-identified] : Small [de-identified] : other [de-identified] : None [de-identified] : None [de-identified] : 100% [de-identified] : No [de-identified] : Ace wraps [de-identified] : Every other day [de-identified] : Primary Dressing

## 2023-05-12 NOTE — H&P PST ADULT - ENMT
negative No oral lesions; no gross abnormalities [facility] Pregnancy And Lactation Text: This medication is Pregnancy Category C and it isn't known if it is safe during pregnancy. It is unknown if it is excreted in breast milk.

## 2025-06-22 ENCOUNTER — NON-APPOINTMENT (OUTPATIENT)
Age: 80
End: 2025-06-22